# Patient Record
Sex: MALE | Race: WHITE | Employment: FULL TIME | URBAN - METROPOLITAN AREA
[De-identification: names, ages, dates, MRNs, and addresses within clinical notes are randomized per-mention and may not be internally consistent; named-entity substitution may affect disease eponyms.]

---

## 2018-10-16 ENCOUNTER — HOSPITAL ENCOUNTER (OUTPATIENT)
Facility: CLINIC | Age: 28
Discharge: HOME | End: 2018-10-17
Attending: EMERGENCY MEDICINE | Admitting: INTERNAL MEDICINE
Payer: COMMERCIAL

## 2018-10-16 DIAGNOSIS — T78.40XA ALLERGIC REACTION, INITIAL ENCOUNTER: ICD-10-CM

## 2018-10-16 DIAGNOSIS — T78.05XA: ICD-10-CM

## 2018-10-16 PROBLEM — T78.2XXA ANAPHYLACTIC REACTION: Status: ACTIVE | Noted: 2018-10-16

## 2018-10-16 LAB
ANION GAP SERPL CALCULATED.3IONS-SCNC: 7 MMOL/L (ref 3–14)
BASOPHILS # BLD AUTO: 0 10E9/L (ref 0–0.2)
BASOPHILS NFR BLD AUTO: 0.4 %
BUN SERPL-MCNC: 16 MG/DL (ref 7–30)
CALCIUM SERPL-MCNC: 8.3 MG/DL (ref 8.5–10.1)
CHLORIDE SERPL-SCNC: 106 MMOL/L (ref 94–109)
CO2 SERPL-SCNC: 28 MMOL/L (ref 20–32)
CREAT SERPL-MCNC: 0.9 MG/DL (ref 0.66–1.25)
DIFFERENTIAL METHOD BLD: NORMAL
EOSINOPHIL # BLD AUTO: 0.2 10E9/L (ref 0–0.7)
EOSINOPHIL NFR BLD AUTO: 2.6 %
ERYTHROCYTE [DISTWIDTH] IN BLOOD BY AUTOMATED COUNT: 12.3 % (ref 10–15)
GFR SERPL CREATININE-BSD FRML MDRD: >90 ML/MIN/1.7M2
GLUCOSE SERPL-MCNC: 230 MG/DL (ref 70–99)
HCT VFR BLD AUTO: 44.6 % (ref 40–53)
HGB BLD-MCNC: 15.4 G/DL (ref 13.3–17.7)
IMM GRANULOCYTES # BLD: 0 10E9/L (ref 0–0.4)
IMM GRANULOCYTES NFR BLD: 0.1 %
LYMPHOCYTES # BLD AUTO: 2.1 10E9/L (ref 0.8–5.3)
LYMPHOCYTES NFR BLD AUTO: 27.5 %
MCH RBC QN AUTO: 30.7 PG (ref 26.5–33)
MCHC RBC AUTO-ENTMCNC: 34.5 G/DL (ref 31.5–36.5)
MCV RBC AUTO: 89 FL (ref 78–100)
MONOCYTES # BLD AUTO: 0.4 10E9/L (ref 0–1.3)
MONOCYTES NFR BLD AUTO: 5.7 %
NEUTROPHILS # BLD AUTO: 5 10E9/L (ref 1.6–8.3)
NEUTROPHILS NFR BLD AUTO: 63.7 %
NRBC # BLD AUTO: 0 10*3/UL
NRBC BLD AUTO-RTO: 0 /100
PLATELET # BLD AUTO: 182 10E9/L (ref 150–450)
POTASSIUM SERPL-SCNC: 3.6 MMOL/L (ref 3.4–5.3)
RBC # BLD AUTO: 5.01 10E12/L (ref 4.4–5.9)
SODIUM SERPL-SCNC: 141 MMOL/L (ref 133–144)
WBC # BLD AUTO: 7.8 10E9/L (ref 4–11)

## 2018-10-16 PROCEDURE — 85025 COMPLETE CBC W/AUTO DIFF WBC: CPT | Performed by: EMERGENCY MEDICINE

## 2018-10-16 PROCEDURE — 99285 EMERGENCY DEPT VISIT HI MDM: CPT | Mod: 25

## 2018-10-16 PROCEDURE — 99222 1ST HOSP IP/OBS MODERATE 55: CPT | Mod: AI | Performed by: INTERNAL MEDICINE

## 2018-10-16 PROCEDURE — 96374 THER/PROPH/DIAG INJ IV PUSH: CPT

## 2018-10-16 PROCEDURE — 96375 TX/PRO/DX INJ NEW DRUG ADDON: CPT

## 2018-10-16 PROCEDURE — 80048 BASIC METABOLIC PNL TOTAL CA: CPT | Performed by: EMERGENCY MEDICINE

## 2018-10-16 PROCEDURE — 25000125 ZZHC RX 250

## 2018-10-16 PROCEDURE — 25000125 ZZHC RX 250: Performed by: EMERGENCY MEDICINE

## 2018-10-16 PROCEDURE — 96361 HYDRATE IV INFUSION ADD-ON: CPT

## 2018-10-16 PROCEDURE — 25000128 H RX IP 250 OP 636

## 2018-10-16 PROCEDURE — 25000128 H RX IP 250 OP 636: Performed by: EMERGENCY MEDICINE

## 2018-10-16 PROCEDURE — 21000000 ZZH R&B IMCU HEART CARE

## 2018-10-16 PROCEDURE — 96372 THER/PROPH/DIAG INJ SC/IM: CPT

## 2018-10-16 RX ORDER — NITROGLYCERIN 0.4 MG/1
0.4 TABLET SUBLINGUAL EVERY 5 MIN PRN
Status: DISCONTINUED | OUTPATIENT
Start: 2018-10-16 | End: 2018-10-17 | Stop reason: HOSPADM

## 2018-10-16 RX ORDER — NALOXONE HYDROCHLORIDE 0.4 MG/ML
.1-.4 INJECTION, SOLUTION INTRAMUSCULAR; INTRAVENOUS; SUBCUTANEOUS
Status: DISCONTINUED | OUTPATIENT
Start: 2018-10-16 | End: 2018-10-17 | Stop reason: HOSPADM

## 2018-10-16 RX ORDER — METHYLPREDNISOLONE SODIUM SUCCINATE 125 MG/2ML
125 INJECTION, POWDER, LYOPHILIZED, FOR SOLUTION INTRAMUSCULAR; INTRAVENOUS
Status: DISCONTINUED | OUTPATIENT
Start: 2018-10-16 | End: 2018-10-17 | Stop reason: HOSPADM

## 2018-10-16 RX ORDER — ONDANSETRON 2 MG/ML
INJECTION INTRAMUSCULAR; INTRAVENOUS
Status: COMPLETED
Start: 2018-10-16 | End: 2018-10-16

## 2018-10-16 RX ORDER — ONDANSETRON 2 MG/ML
4 INJECTION INTRAMUSCULAR; INTRAVENOUS EVERY 6 HOURS PRN
Status: DISCONTINUED | OUTPATIENT
Start: 2018-10-16 | End: 2018-10-17 | Stop reason: HOSPADM

## 2018-10-16 RX ORDER — DIPHENHYDRAMINE HYDROCHLORIDE 50 MG/ML
25 INJECTION INTRAMUSCULAR; INTRAVENOUS ONCE
Status: COMPLETED | OUTPATIENT
Start: 2018-10-16 | End: 2018-10-16

## 2018-10-16 RX ORDER — ONDANSETRON 2 MG/ML
4 INJECTION INTRAMUSCULAR; INTRAVENOUS ONCE
Status: DISCONTINUED | OUTPATIENT
Start: 2018-10-16 | End: 2018-10-16

## 2018-10-16 RX ORDER — DIPHENHYDRAMINE HYDROCHLORIDE 50 MG/ML
50 INJECTION INTRAMUSCULAR; INTRAVENOUS
Status: DISCONTINUED | OUTPATIENT
Start: 2018-10-16 | End: 2018-10-17 | Stop reason: HOSPADM

## 2018-10-16 RX ORDER — ONDANSETRON 4 MG/1
4 TABLET, ORALLY DISINTEGRATING ORAL EVERY 6 HOURS PRN
Status: DISCONTINUED | OUTPATIENT
Start: 2018-10-16 | End: 2018-10-17 | Stop reason: HOSPADM

## 2018-10-16 RX ORDER — METHYLPREDNISOLONE SODIUM SUCCINATE 125 MG/2ML
125 INJECTION, POWDER, LYOPHILIZED, FOR SOLUTION INTRAMUSCULAR; INTRAVENOUS ONCE
Status: COMPLETED | OUTPATIENT
Start: 2018-10-16 | End: 2018-10-16

## 2018-10-16 RX ORDER — ONDANSETRON 2 MG/ML
4 INJECTION INTRAMUSCULAR; INTRAVENOUS ONCE
Status: COMPLETED | OUTPATIENT
Start: 2018-10-16 | End: 2018-10-16

## 2018-10-16 RX ORDER — ACETAMINOPHEN 325 MG/1
650 TABLET ORAL EVERY 4 HOURS PRN
Status: DISCONTINUED | OUTPATIENT
Start: 2018-10-16 | End: 2018-10-17 | Stop reason: HOSPADM

## 2018-10-16 RX ORDER — SODIUM CHLORIDE 9 MG/ML
INJECTION, SOLUTION INTRAVENOUS CONTINUOUS PRN
Status: DISCONTINUED | OUTPATIENT
Start: 2018-10-16 | End: 2018-10-17 | Stop reason: HOSPADM

## 2018-10-16 RX ORDER — AMOXICILLIN 250 MG
1 CAPSULE ORAL 2 TIMES DAILY PRN
Status: DISCONTINUED | OUTPATIENT
Start: 2018-10-16 | End: 2018-10-17 | Stop reason: HOSPADM

## 2018-10-16 RX ORDER — IBUPROFEN 600 MG/1
600 TABLET, FILM COATED ORAL EVERY 6 HOURS PRN
Status: DISCONTINUED | OUTPATIENT
Start: 2018-10-16 | End: 2018-10-17 | Stop reason: HOSPADM

## 2018-10-16 RX ORDER — AMOXICILLIN 250 MG
2 CAPSULE ORAL 2 TIMES DAILY PRN
Status: DISCONTINUED | OUTPATIENT
Start: 2018-10-16 | End: 2018-10-17 | Stop reason: HOSPADM

## 2018-10-16 RX ORDER — ALBUTEROL SULFATE 90 UG/1
2 AEROSOL, METERED RESPIRATORY (INHALATION)
Status: DISCONTINUED | OUTPATIENT
Start: 2018-10-16 | End: 2018-10-17 | Stop reason: HOSPADM

## 2018-10-16 RX ORDER — LIDOCAINE 40 MG/G
CREAM TOPICAL
Status: DISCONTINUED | OUTPATIENT
Start: 2018-10-16 | End: 2018-10-17 | Stop reason: HOSPADM

## 2018-10-16 RX ORDER — ALBUTEROL SULFATE 0.83 MG/ML
2.5 SOLUTION RESPIRATORY (INHALATION)
Status: DISCONTINUED | OUTPATIENT
Start: 2018-10-16 | End: 2018-10-17 | Stop reason: HOSPADM

## 2018-10-16 RX ADMIN — DIPHENHYDRAMINE HYDROCHLORIDE 25 MG: 50 INJECTION, SOLUTION INTRAMUSCULAR; INTRAVENOUS at 19:08

## 2018-10-16 RX ADMIN — ONDANSETRON 4 MG: 2 INJECTION INTRAMUSCULAR; INTRAVENOUS at 19:09

## 2018-10-16 RX ADMIN — EPINEPHRINE 0.5 MG: 1 INJECTION INTRAMUSCULAR; INTRAVENOUS; SUBCUTANEOUS at 19:06

## 2018-10-16 RX ADMIN — SODIUM CHLORIDE 1000 ML: 9 INJECTION, SOLUTION INTRAVENOUS at 18:42

## 2018-10-16 RX ADMIN — FAMOTIDINE 20 MG: 10 INJECTION, SOLUTION INTRAVENOUS at 18:41

## 2018-10-16 RX ADMIN — METHYLPREDNISOLONE SODIUM SUCCINATE 125 MG: 125 INJECTION, POWDER, FOR SOLUTION INTRAMUSCULAR; INTRAVENOUS at 18:40

## 2018-10-16 RX ADMIN — DIPHENHYDRAMINE HYDROCHLORIDE 25 MG: 50 INJECTION, SOLUTION INTRAMUSCULAR; INTRAVENOUS at 18:40

## 2018-10-16 ASSESSMENT — ENCOUNTER SYMPTOMS
NAUSEA: 0
TROUBLE SWALLOWING: 0
CHEST TIGHTNESS: 1
VOMITING: 0

## 2018-10-16 NOTE — ED PROVIDER NOTES
History     Chief Complaint:  Allergic Reaction    HPI   Ludwin Kwon is a 28 year old male who presents with an allergic reaction. The patient has a history of nut allergies and ate a piece of Lindt chocolate that may have had nuts in it about 45 minutes prior to arrival. Following the ingestion, he had an onset of throat tightness, mild chest tightness, and itching to the groin. He has had nausea and vomiting with his allergic reactions in the past, but no nausea or vomiting today. He self-administered his EpiPen about 30 minutes prior to arrival. No previous hospitalizations or intubation for anaphylaxis. No difficulty swallowing or any other symptoms at this time.    Allergies:  No known drug allergies      Medications:    The patient is currently on no regular medications.     Past Medical History:    The patient does not have any past pertinent medical history.     Past Surgical History:    History reviewed. No pertinent surgical history.     Family History:    History reviewed. No pertinent family history.      Social History:  Smoking status: Never smoker  Alcohol use: No     Review of Systems   HENT: Negative for trouble swallowing.         Throat tightness     Respiratory: Positive for chest tightness.    Gastrointestinal: Negative for nausea and vomiting.   Skin: Positive for rash.   All other systems reviewed and are negative.    Physical Exam   Patient Vitals for the past 24 hrs:   BP Temp Temp src Pulse Heart Rate Resp SpO2 Height Weight   10/16/18 1915 123/64 - - - 86 11 100 % - -   10/16/18 1900 120/78 - - - 92 17 98 % - -   10/16/18 1845 134/79 - - - 78 12 100 % - -   10/16/18 1830 136/81 - - - 103 19 98 % - -   10/16/18 1819 132/69 98.5  F (36.9  C) Temporal 98 - 16 99 % 1.829 m (6') 80.7 kg (178 lb)      Physical Exam  General: Alert, appears well-developed and well-nourished. Cooperative.     In mild distress  HEENT:  Head:  Atraumatic  Ears:  External ears are normal  Mouth/Throat:   Oropharynx is without erythema or exudate and mucous membranes are dry. Tonsils and uvula mildly edematous.   Eyes:   Conjunctivae normal and EOM are normal. No scleral icterus.    Pupils are equal, round, and reactive to light.   CV:  Normal rate, regular rhythm, normal heart sounds and radial pulses are 2+ and symmetric.  No murmur  Resp:  Lungs are clear bilaterally. No wheezing.    Non-labored, no retractions or accessory muscle use  GI:  Abdomen is soft, no distension, no tenderness. No rebound or guarding.  No CVA tenderness bilaterally  MS:  Normal range of motion. No edema.    Normal strength in all 4 extremities.     Back atraumatic.    No midline cervical, thoracic, or lumbar tenderness  Skin:  Warm and dry.  Raised erythema to anterior chest and neck as well as groin spares extremities.   Neuro:   Alert. Normal strength.   GCS: 15  Psych: Normal mood and affect.    Emergency Department Course   Laboratory:  CBC: WNL (WBC 7.8, HGB 15.4, )  BMP: Glucose 230 (H), Calcium 8.3 (L) WNL (Creatinine 0.90)     Interventions:  1840: Solu-Medrol 125 mg IV  1840: Benadryl 25 mg IV  1841: Pepcid 20 mg IV  1842: NS 1L IV Bolus   1906: Adrenalin 0.5 mg IM  1908: Benadryl 25 mg IV  1909: Zofran 4 mg IV     Emergency Department Course:  Past medical records, nursing notes, and vitals reviewed.  1825: I performed an exam of the patient and obtained history, as documented above.   Peripheral IV established. Blood drawn for basic laboratory. Results as noted above.     Patient was given the above interventions while here in the emergency department.   1904: I rechecked the patient. Patient began having vomiting and increased hives to his body. Discussed admission with the patient and he is agreeable with the plan.  1922: I discussed the case with Dr. Delacruz, hospitalist service who accepts the patient for further care, monitoring, and treatment.     Impression & Plan    Medical Decision Making:  Patient is a  28-year-old male with a history of tree nut allergies who presents after a suspected ingestion of potential tree nut.  45 minutes arrival patient had a chocolate which may have been exposed to tree nuts and patient approximately 15 minutes following ingestion started to have some throat swelling as well as high production and concern for potential anaphylaxis.  He self administered an EpiPen 30 minutes prior to arrival in the emergency department.  Here patient has hives to the neck and anterior chest as well as his groin.  He received methylprednisolone, Benadryl, Zantac, and IV fluids although had a repeat episode of vomiting and nausea which is consistent with his prior anaphylactic presentations.  Reassuringly he has never needed to be intubated in the past although has required multiple doses of epinephrine for allergic reactions in the past.  Due to the repeat dose of epinephrine which was administered here in the emergency department after his rebound vomiting and nausea I elected to admit the patient for further observation overnight.  Patient case was discussed with Dr. Delacruz of the hospitalist service who agreed to inpatient [facility] admission at this time.    Diagnosis:    ICD-10-CM   1. Allergic reaction, initial encounter T78.40XA   2. Anaphylaxis due to tree nuts or seeds, initial encounter T78.05XA     Disposition:  Admitted to inpatient [facility]    Christine Tijerina  10/16/2018    EMERGENCY DEPARTMENT  Christine CANNON Do, am serving as a scribe at 6:25 PM on 10/16/2018 to document services personally performed by Duncan Kelley MD based on my observations and the provider's statements to me.       Duncan Kelley MD  10/17/18 0249

## 2018-10-16 NOTE — IP AVS SNAPSHOT
[facility]    [address]    [facility] [address]    Phone:  [phone]                                       After Visit Summary   10/16/2018    Ludwin Kwon    MRN: [id number]           After Visit Summary Signature Page     I have received my discharge instructions, and my questions have been answered. I have discussed any challenges I see with this plan with the nurse or doctor.    ..........................................................................................................................................  Patient/Patient Representative Signature      ..........................................................................................................................................  Patient Representative Print Name and Relationship to Patient    ..................................................               ................................................  Date                                   Time    ..........................................................................................................................................  Reviewed by Signature/Title    ...................................................              ..............................................  Date                                               Time          22EPIC Rev 08/18

## 2018-10-16 NOTE — IP AVS SNAPSHOT
MRN:[id number]                      After Visit Summary   10/16/2018    Ludwin Kwon    MRN: [id number]           Thank you!     Thank you for choosing [facility] for your care. Our goal is always to provide you with excellent care. Hearing back from our patients is one way we can continue to improve our services. Please take a few minutes to complete the written survey that you may receive in the mail after you visit with us. Thank you!        Patient Information     Date Of Birth          1990        Designated Caregiver       Most Recent Value    Caregiver    Will someone help with your care after discharge? yes    Name of designated caregiver none    Phone number of caregiver none    Caregiver address same      About your hospital stay     You were admitted on:  October 16, 2018 You last received care in the:  [facility]    You were discharged on:  October 17, 2018        Reason for your hospital stay       Anaphylaxis                  Who to Call     For medical emergencies, please call 911.  For non-urgent questions about your medical care, please call your primary care provider or clinic, None          Attending Provider     Provider Specialty    Duncan Kelley MD Emergency Medicine    Jayme, Ramiro Olivares MD Internal Medicine    Jaret, Kevin Block MD Internal Medicine       Primary Care Provider Fax #    Physician No Ref-Primary [phone]      After Care Instructions     Activity       Your activity upon discharge: activity as tolerated            Diet       Follow this diet upon discharge: Orders Placed This Encounter      Combination Diet Regular Diet Adult                  Follow-up Appointments     Follow-up and recommended labs and tests        Follow up with primary care provider, Physician No Ref-Primary, within 7 days for hospital follow- up.  No follow up labs or test are needed.                  Pending Results     No orders found for last 3  "day(s).            Statement of Approval     Ordered          10/17/18 1322  I have reviewed and agree with all the recommendations and orders detailed in this document.  EFFECTIVE NOW     Approved and electronically signed by:  Kevin Raygoza MD             Admission Information     Date & Time Provider Department Dept. Phone    10/16/2018 Kevin Raygoza MD [facility] [phone]      Your Vitals Were     Blood Pressure Pulse Temperature Respirations Height Weight    97/50 98 98.3  F (36.8  C) (Oral) 16 1.829 m (6') 78.5 kg (173 lb)    Pulse Oximetry BMI (Body Mass Index)                98% 23.46 kg/m2          MyChart Information     [facility] lets you send messages to your doctor, view your test results, renew your prescriptions, schedule appointments and more. To sign up, go to [email or web address] . Click on \"Log in\" on the left side of the screen, which will take you to the Welcome page. Then click on \"Sign up Now\" on the right side of the page.     You will be asked to enter the access code listed below, as well as some personal information. Please follow the directions to create your username and password.     Your access code is: [email or web address]  Expires: 1/15/2019  1:51 PM     Your access code will  in 90 days. If you need help or a new code, please call your [facility] clinic or [phone].        Care EveryWhere ID     This is your Care EveryWhere ID. This could be used by other organizations to access your [facility] medical records  [id number]        Equal Access to Services     [facility]: Hadii alejandra gonzales hadkatelyno Sonaimaali, waaxda luqadaha, qaybta kaalmada homer santiago . So [facility] [phone].    ATENCIÓN: Si habla español, tiene a fink disposición servicios gratuitos de asistencia lingüística. Llame al [phone].    We comply with applicable federal civil rights laws and [place] laws. We do not discriminate on the " basis of race, color, national origin, age, disability, sex, sexual orientation, or gender identity.               Review of your medicines      START taking        Dose / Directions    EPINEPHrine 0.3 MG/0.3ML injection 2-pack   Commonly known as:  EPIPEN/ADRENACLICK/or ANY BX GENERIC EQUIV   Used for:  Anaphylaxis due to tree nuts or seeds, initial encounter        Dose:  0.3 mg   Inject 0.3 mLs (0.3 mg) into the muscle as needed for anaphylaxis   Quantity:  0.6 mL   Refills:  1            Where to get your medicines      These medications were sent to [facility] [address] [facility], [place] - [address] & [facility]  [address] [facility] [address]     Phone:  [phone]     EPINEPHrine 0.3 MG/0.3ML injection 2-pack                Protect others around you: Learn how to safely use, store and throw away your medicines at www.disposemymeds.org.             Medication List: This is a list of all your medications and when to take them. Check marks below indicate your daily home schedule. Keep this list as a reference.      Medications           Morning Afternoon Evening Bedtime As Needed    EPINEPHrine 0.3 MG/0.3ML injection 2-pack   Commonly known as:  EPIPEN/ADRENACLICK/or ANY BX GENERIC EQUIV   Inject 0.3 mLs (0.3 mg) into the muscle as needed for anaphylaxis                                             More Information        Epinephrine injection (Auto-injector)  Brand Names: Adrenaclick, Auvi-Q, epinephrinesnap, epinephrinesnap-v, EpiPen, Twinject  What is this medicine?  EPINEPHRINE (ep i RENÉ rin) is used for the emergency treatment of severe allergic reactions. You should keep this medicine with you at all times.  How should I use this medicine?  This medicine is for injection into the outer thigh. Your doctor or health care professional will instruct you on the proper use of the device during an emergency. Read all directions carefully and make sure  you understand them. Do not use more often than directed.  Talk to your pediatrician regarding the use of this medicine in children. Special care may be needed. This drug is commonly used in children. A special device is available for use in children. If you are giving this medicine to a young child, hold their leg firmly in place before and during the injection to prevent injury.  What side effects may I notice from receiving this medicine?  Side effects that you should report to your doctor or health care professional as soon as possible:    allergic reactions like skin rash, itching or hives, swelling of the face, lips, or tongue    breathing problems    chest pain    fast, irregular heartbeat    pain, tingling, numbness in the hands or feet    pain, redness, or irritation at site where injected    vomiting  Side effects that usually do not require medical attention (report to your doctor or health care professional if they continue or are bothersome):    anxious    dizziness    dry mouth    headache    increased sweating    nausea    unusually weak or tired  What may interact with this medicine?  This medicine is only used during an emergency. Significant drug interactions are not likely during emergency use.  What if I miss a dose?  This does not apply. You should only use this medicine for an allergic reaction.  Where should I keep my medicine?  Keep out of the reach of children.  Store at room temperature between 15 and 30 degrees C (59 and 86 degrees F). Protect from light and heat. The solution should be clear in color. If the solution is discolored or contains particles it must be replaced. Throw away any unused medicine after the expiration date. Ask your doctor or pharmacist about proper disposal of the injector if it is  or has been used. Always replace your auto-injector before it expires.  What should I tell my health care provider before I take this medicine?  They need to know if you have any  of the following conditions:    diabetes    heart disease    high blood pressure    lung or breathing disease, like asthma    Parkinson's disease    thyroid disease    an unusual or allergic reaction to epinephrine, sulfites, other medicines, foods, dyes, or preservatives    pregnant or trying to get pregnant    breast-feeding  What should I watch for while using this medicine?  Keep this medicine ready for use in the case of a severe allergic reaction. Make sure that you have the phone number of your doctor or health care professional and local hospital ready. Remember to check the expiration date of your medicine regularly. You may need to have additional units of this medicine with you at work, school, or other places. Talk to your doctor or health care professional about your need for extra units. Some emergencies may require an additional dose. Check with your doctor or a health care professional before using an extra dose.  After use, go to the nearest hospital or call 911. Avoid physical activity. Make sure the treating health care professional knows you have received an injection of this medicine. You will receive additional instructions on what to do during and after use of this medicine before a medical emergency occurs.  NOTE:This sheet is a summary. It may not cover all possible information. If you have questions about this medicine, talk to your doctor, pharmacist, or health care provider. Copyright  2018 [employer]vier

## 2018-10-17 VITALS
DIASTOLIC BLOOD PRESSURE: 50 MMHG | RESPIRATION RATE: 16 BRPM | HEART RATE: 98 BPM | BODY MASS INDEX: 23.43 KG/M2 | SYSTOLIC BLOOD PRESSURE: 97 MMHG | OXYGEN SATURATION: 98 % | HEIGHT: 72 IN | TEMPERATURE: 98.3 F | WEIGHT: 173 LBS

## 2018-10-17 PROBLEM — T78.2XXA ANAPHYLAXIS: Status: ACTIVE | Noted: 2018-10-17

## 2018-10-17 PROCEDURE — 40000884 ZZH STATISTIC STEP DOWN HRS NIGHT

## 2018-10-17 PROCEDURE — G0378 HOSPITAL OBSERVATION PER HR: HCPCS

## 2018-10-17 PROCEDURE — 99217 ZZC OBSERVATION CARE DISCHARGE: CPT | Performed by: INTERNAL MEDICINE

## 2018-10-17 RX ORDER — EPINEPHRINE 0.3 MG/.3ML
0.3 INJECTION SUBCUTANEOUS PRN
Qty: 0.6 ML | Refills: 1 | Status: SHIPPED | OUTPATIENT
Start: 2018-10-17

## 2018-10-17 NOTE — H&P
[facility]    History and Physical  Hospitalist       Date of Admission:  10/16/2018  Date of Service (when I saw the patient): 10/16/18    Assessment & Plan   Ludwin Kwon is a 28 year old male who presents with an anaphylactic reaction    Anaphylactic reaction  With known h/o anaphylaxis in the past with peanuts. This evening ate chocolate dessert and after eating started to feel throat tightness, mild chest tightness and groin itching. En route to ED gave himself dose of epi (pen, 0.3 mg). No hospitalizations or intubations 2/2 anaphylaxis in the past. Historically has n/v with anaphylaxis (developed in the ED)  - given second dose of 0.5 mg epi in ED  - given diphenhydramine 25 mg x 2, famotidine 20 mg, methylprednisone 125 mg  - ondansetron for nausea/ vomiting  - epi kit at bedside  - IMC monitoring with continuous O2 saturation monitoring, telemetry  - prn diphenhydramine  - if any respiratory compromise, will need intubation  - prn nebs  - potential discharge in the am if remains stable.     Pain plan: # Pain Assessment:  Current Pain Score 10/16/2018   Pain score (0-10) 3   Ludwin pearson pain level was assessed and he currently denies pain.      DVT Prophylaxis: Low Risk/Ambulatory with no VTE prophylaxis indicated  Code Status: Full Code    Disposition: Expected discharge possible in the am 10/17 if anaphylaxis resolves/ remains stable    Ramiro Delacruz MD  [phone] (P)  Text Page     Primary Care Physician   None    Chief Complaint   Anaphylaxis    History is obtained from the patient and medical records    History of Present Illness   Ludwin Kwon is a 28 year old male who presents with an anaphylactic reaction.  He has a known history of not allergy.  He ingested a chocolate dessert at some point this afternoon.  Shortly after that he started to have tightening in his throat chest tightness and   Itching.  He may recognize that he is likely having an anaphylactic reaction, and he  administered 0.3 mg of epinephrine.  The emergency department he had worsening hives as well.  He was given a dose of methylprednisolone, diphenhydramine x2, and famotidine x1.  At the time my visit he felt as if his reaction was subsiding.  His lip swelling had diminished and he had no shortness of breath.  Denies any chest pain.  Denies any other complaints.    Past Medical History    I have reviewed this patient's medical history and updated it with pertinent information if needed.   History reviewed. No pertinent past medical history.    Past Surgical History   I have reviewed this patient's surgical history and updated it with pertinent information if needed.  History reviewed. No pertinent surgical history.    Prior to Admission Medications   None     Allergies   Allergies   Allergen Reactions     Nuts      Pomegranate [Punica]        Social History   I have reviewed this patient's social history and updated it with pertinent information if needed. Ludwin Kwon  reports that he has never smoked. He has never used smokeless tobacco. He reports that he does not drink alcohol or use illicit drugs.    Family History   I have reviewed this patient's family history and updated it with pertinent information if needed.   Denies family history on questioning    Review of Systems   The 10 point Review of Systems is negative other than noted in the HPI or here.     Physical Exam   Temp: 98.5  F (36.9  C) Temp src: Temporal BP: 123/64 Pulse: 98 Heart Rate: 86 Resp: 11 SpO2: 100 % O2 Device: None (Room air)    Vital Signs with Ranges  173 lbs 11.56 oz    Constitutional: alert, oriented and in no acute distress  Eyes: EOMI, PERRL  HEENT: OP clear. No obvious oropharyngeal edema/ swelling  Respiratory: CTA B without wheezing, good air movement  Cardiovascular: RRR  GI: soft, nontender, nondistended, no HSM  Lymph/Hematologic: no cervical LAD  Genitourinary: deferred  Skin: mild diffuse erythema  Musculoskeletal: no  deformities or arthritis  Neurologic: CN II-XII, WAY, sensation grossly intact  Psychiatric: mood and affect wnl    Data   Data reviewed today:  I personally reviewed no images or EKG's today.    Recent Labs  Lab 10/16/18  1838   WBC 7.8   HGB 15.4   MCV 89         POTASSIUM 3.6   CHLORIDE 106   CO2 28   BUN 16   CR 0.90   ANIONGAP 7   SOPHIA 8.3*   *       No results found for this or any previous visit (from the past 24 hour(s)).

## 2018-10-17 NOTE — ED NOTES
[facility]  ED Nurse Handoff Report    ED Chief complaint: Allergic Reaction (Patient states ate a piece of chocolate and thinks might have nuts in it. Patient self administered his epi pen. Had some throat tightness. States throat feels a little tight and has improved since the epi.)      ED Diagnosis:   Final diagnoses:   Allergic reaction, initial encounter   Anaphylaxis due to tree nuts or seeds, initial encounter       Code Status: Full Code    Allergies:   Allergies   Allergen Reactions     Nuts      Pomegranate [Punica]        Activity level - Baseline/Home:  Independent    Activity Level - Current:   Independent     Needed?: No    Isolation: No  Infection: Not Applicable  Bariatric?: No    Vital Signs:   Vitals:    10/16/18 1830 10/16/18 1845 10/16/18 1900 10/16/18 1915   BP: 136/81 134/79 120/78 123/64   Pulse:       Resp: 19 12 17 11   Temp:       TempSrc:       SpO2: 98% 100% 98% 100%   Weight:       Height:           Cardiac Rhythm: NSR to Sinus Tachycardia    Pain level: 0-10 Pain Scale: 3    Is this patient confused?: No  Snyder - Suicide Severity Rating Scale Completed?  Yes  If yes, what color did the patient score?  White    Patient Report: Initial Complaint: Pt is allergic to nuts and pomegranate. At about 1830 the pt ate chocolate which unknowingly had nuts in nut or was produced in a factory with nuts. Gave himself 1 IM epi PTA. While in ED he developed worsening of hives, nausea, & chest tightness requiring a second dose of IM epi (in addition to other medications - per MAR). Never had stridor or wheezes. Had immediate improvement of symptoms with second dose of epi, but needs to be monitored for return of symptoms. Labs unremarkable.    Family Comments: Wife and kids at bedside.    OBS brochure/video discussed/provided to patient/family: Yes    ED Medications:   Medications   0.9% sodium chloride BOLUS (1,000 mLs Intravenous New Bag 10/16/18 1842)    methylPREDNISolone sodium succinate (solu-MEDROL) injection 125 mg (125 mg Intravenous Given 10/16/18 1840)   diphenhydrAMINE (BENADRYL) injection 25 mg (25 mg Intravenous Given 10/16/18 1840)   famotidine (PEPCID) injection 20 mg (20 mg Intravenous Given 10/16/18 1841)   EPINEPHrine (ADRENALIN) kit 0.5 mg (0.5 mg Intramuscular Given 10/16/18 1906)   diphenhydrAMINE (BENADRYL) injection 25 mg (25 mg Intravenous Given 10/16/18 1908)   ondansetron (ZOFRAN) injection 4 mg (4 mg Intravenous Given 10/16/18 1909)       Drips infusing?:  Yes - NS bolus    For the majority of the shift this patient was Green.   Interventions performed were N/A.    Severe Sepsis OR Septic Shock Diagnosis Present: No    To be done/followed up on inpatient unit:  Continued care.    ED NURSE PHONE NUMBER: [phone]

## 2018-10-17 NOTE — ED NOTES
IV inserted. Labs obtained. Specimens sent to lab. Pt medicated per MAR. Assessment per flowsheet.

## 2018-10-17 NOTE — ED NOTES
Pt's hives worsened and he had 1 episode of emesis. Pt also reported that he felt his chest tightness was returning/worsening. No wheezes. No stridor. Dr. Kelley made aware.

## 2018-10-17 NOTE — DISCHARGE SUMMARY
Admit Date:     10/16/2018   Discharge Date:           PRIMARY CARE PHYSICIAN:  None.      CODE STATUS:  Full code.      DATE OF ADMISSION:  10/16/2018.      DATE OF DISCHARGE:  10/17/2018.      REASON FOR ADMISSION:  Anaphylactic reaction.      DISCHARGE DISPOSITION:  Discharge to home.      FOLLOWUP INSTRUCTIONS:  None.      DISCHARGE MEDICATIONS:  EpiPen 0.3 mg/0.3 mL injection, 2-pack with 1 refill.      CONSULTS:  None.      PHYSICAL EXAMINATION ON DAY OF DISCHARGE:   VITAL SIGNS:  Blood pressure 109/67, O2 sats 98% on room air, respiratory rate 16, heart rate 58, temperature is 98.2 degrees Fahrenheit.   GENERAL:  Well-nourished young male in no apparent distress, alert and oriented x 4.   HEENT:  No oropharyngeal erythema or edema.  No cervical lymphadenopathy, no thyromegaly.   RESPIRATORY:  Lungs clear to auscultation bilaterally, normal work of breathing, no wheeze, rales, rhonchi.  No stridor.   CARDIOVASCULAR:  Regular rate and rhythm.  No murmurs, rubs, or gallops.  2+ pulse palpable in all 4 extremities.   ABDOMEN:  Normal bowel sounds.  Abdomen is soft, nontender, nondistended.  No hepatosplenomegaly or mass palpable.   EXTREMITIES:  No clubbing, cyanosis, or edema.   SKIN:  Cool, dry.  No edema.  No cyanosis.      HOSPITAL COURSE:  Mr. Kwon was admitted by my colleague, Dr. Ramiro Delacruz, last night.  The patient had been at dinner and eating a chocolate dessert.  He has a longstanding ALLERGY FOR PEANUTS WHICH CAUSE ANAPHYLAXIS.  He started having very familiar symptoms to him with his throat feeling closed off, difficulty breathing, and some wheezing.  He came into the Emergency Department immediately.  He has never had to be intubated or hospitalized for his anaphylaxis in the past.  He gave himself his own EpiPen en route to the Emergency Room.  Along with some throat tightness, he did have some chest tightness and itching.  In the Emergency Department, he was given 0.5 mg of epinephrine,  diphenhydramine 50 mg, famotidine 20 mg, and dose of methylprednisolone 125 mg.  We admitted him to our [facility] for close monitoring and had epinephrine kit at bedside.  He did great overnight.  He has shown no signs of biphasic anaphylactic reaction.  It is now 15 hours after admission.  I think he is safe for discharge.  I have got a new prescription for his epinephrine pen and a refill.         KIRAN CALIXTO MD             D: 10/17/2018   T: 10/17/2018   MT: NTS      Name:     MARGE ROCHE   MRN:      [id number]        Account:        [id number]   :      1990           Admit Date:     10/16/2018                                  Discharge Date:       Document: [id number]

## 2018-10-17 NOTE — UTILIZATION REVIEW
"Admission Status; Secondary Review Determination     Under the authority of the Utilization Management Committee, the utilization review process indicated a secondary review on the above patient.  The review outcome is based on review of the medical records, discussions with staff, and applying clinical experience noted on the date of the review.       (x) Observation Status Appropriate - This patient does not meet hospital inpatient criteria and is placed in observation status. If this patient's primary payer is Medicare and was admitted as an inpatient, Condition Code 44 should be used and patient status changed to \"observation\".     RATIONALE FOR DETERMINATION: 28-year-old male with history of anaphylaxis presented with anaphylaxis to the emergency room.  The angioedema symptoms were controlled by the time patient was admitted with no signs of respiratory distress or hypotension.  Observation care appropriate to monitor patient's ongoing reactions to ensure no acute anaphylactic deterioration.  If patient does not stabilize within 24 hours and requires ongoing active management, then at that time inpatient services would be appropriate.       The severity of illness, intensity of service provided, expected LOS and risk for adverse outcome make the care appropriate for further observation; however, doesn't meet criteria for hospital inpatient admission. This was discussed with attending physician who concurred with this determination.    The information on this document is developed by the utilization review team in order for the business office to ensure compliance.  This only denotes the appropriateness of proper admission status and does not reflect the quality of care rendered.         The definitions of Inpatient Status and Observation Status used in making the determination above are those provided in the CMS Coverage Manual, Chapter 1 and Chapter 6, section 70.4.      Sincerely,     Abdi Berg MD  "   Physician Advisor  Utilization Review/ Case Management  [facility].

## 2018-10-17 NOTE — PLAN OF CARE
Problem: Patient Care Overview  Goal: Plan of Care/Patient Progress Review  Outcome: Adequate for Discharge Date Met: 10/17/18  Discharge home with friend, no pain, tolerated food, up in the room independent. Patient is feeling good.

## 2018-10-17 NOTE — PHARMACY-ADMISSION MEDICATION HISTORY
Admission medication history interview status for the 10/16/2018  admission is complete. See EPIC admission navigator for prior to admission medications     Medication history source reliability:Good    Actions taken by pharmacist (provider contacted, etc):None     Additional medication history information not noted on PTA med list :None    Medication reconciliation/reorder completed by provider prior to medication history? No    Time spent in this activity: 10 min    Prior to Admission medications    Topical acne medication

## 2018-10-17 NOTE — PLAN OF CARE
Problem: Patient Care Overview  Goal: Plan of Care/Patient Progress Review  Outcome: No Change  Vss.  Denies pain, sob, itching tightness in throat or chest.  Has a few fading hives.  Taking food and fluids po.  Voiding well.  Wife stayed in room overnight.  Ambulated to bathroom independently.  Epi kit at bedside.

## 2020-03-11 ENCOUNTER — HEALTH MAINTENANCE LETTER (OUTPATIENT)
Age: 30
End: 2020-03-11

## 2021-01-03 ENCOUNTER — HEALTH MAINTENANCE LETTER (OUTPATIENT)
Age: 31
End: 2021-01-03

## 2021-04-25 ENCOUNTER — HEALTH MAINTENANCE LETTER (OUTPATIENT)
Age: 31
End: 2021-04-25

## 2021-10-10 ENCOUNTER — HEALTH MAINTENANCE LETTER (OUTPATIENT)
Age: 31
End: 2021-10-10

## 2022-05-21 ENCOUNTER — HEALTH MAINTENANCE LETTER (OUTPATIENT)
Age: 32
End: 2022-05-21

## 2022-06-27 ENCOUNTER — APPOINTMENT (OUTPATIENT)
Dept: URBAN - METROPOLITAN AREA CLINIC 259 | Age: 32
Setting detail: DERMATOLOGY
End: 2022-06-27

## 2022-06-27 DIAGNOSIS — Z41.9 ENCOUNTER FOR PROCEDURE FOR PURPOSES OTHER THAN REMEDYING HEALTH STATE, UNSPECIFIED: ICD-10-CM

## 2022-06-27 PROCEDURE — OTHER SKINPEN: OTHER

## 2022-06-27 ASSESSMENT — LOCATION SIMPLE DESCRIPTION DERM: LOCATION SIMPLE: LEFT FOREHEAD

## 2022-06-27 ASSESSMENT — LOCATION DETAILED DESCRIPTION DERM: LOCATION DETAILED: LEFT INFERIOR MEDIAL FOREHEAD

## 2022-06-27 ASSESSMENT — LOCATION ZONE DERM: LOCATION ZONE: FACE

## 2022-06-27 NOTE — PROCEDURE: SKINPEN
Location #1: Forehead
Depth In Mm: 0.5
Treatment Number (Optional): 1
Depth In Mm: 1.25
Consent: Written consent obtained, risks reviewed including but not limited to pain, scarring, infection and incomplete improvement.  Patient understands the procedure is cosmetic in nature and will require out of pocket payment.
Location #2: Cheeks
Topical Anesthesia?: BLT cream (benzocaine 20%, lidocaine 10%, tetracaine 10%)
Depth In Mm: 2
Location #3: Chin and Lip
Post-Care Instructions: After the procedure, take precautions agains sun exposure. Do not apply sunscreen for 12 hours after the procedure. Do not apply make-up for 12 hours after the procedure. Avoid alcohol based toners for 10-14 days. After 2-3 days patients can return to their regular skin regimen.
Detail Level: Zone
Depth In Mm: 0.25

## 2022-07-28 ENCOUNTER — APPOINTMENT (OUTPATIENT)
Dept: URBAN - METROPOLITAN AREA CLINIC 259 | Age: 32
Setting detail: DERMATOLOGY
End: 2022-07-28

## 2022-07-28 DIAGNOSIS — Z41.9 ENCOUNTER FOR PROCEDURE FOR PURPOSES OTHER THAN REMEDYING HEALTH STATE, UNSPECIFIED: ICD-10-CM

## 2022-07-28 PROCEDURE — OTHER SKINPEN: OTHER

## 2022-07-28 PROCEDURE — OTHER MICRONEEDLING: OTHER

## 2022-07-28 ASSESSMENT — LOCATION SIMPLE DESCRIPTION DERM
LOCATION SIMPLE: RIGHT CHEEK
LOCATION SIMPLE: INFERIOR FOREHEAD
LOCATION SIMPLE: CHIN
LOCATION SIMPLE: LEFT CHEEK

## 2022-07-28 ASSESSMENT — LOCATION DETAILED DESCRIPTION DERM
LOCATION DETAILED: INFERIOR MID FOREHEAD
LOCATION DETAILED: RIGHT CENTRAL MALAR CHEEK
LOCATION DETAILED: LEFT CENTRAL MALAR CHEEK
LOCATION DETAILED: LEFT CHIN

## 2022-07-28 ASSESSMENT — LOCATION ZONE DERM: LOCATION ZONE: FACE

## 2022-07-28 NOTE — PROCEDURE: MICRONEEDLING
Treatment Number (Optional): 2
Topical Anesthesia?: BLT cream (benzocaine 20%, lidocaine 10%, tetracaine 10%)
Post-Care Instructions: After the procedure, take precautions agains sun exposure. Do not apply sunscreen for 12 hours after the procedure. Do not apply make-up for 12 hours after the procedure. Avoid alcohol based toners for 10-14 days. After 2-3 days patients can return to their regular skin regimen.
Depth In Mm: 0.1
Detail Level: Zone
Consent: Written consent obtained, risks reviewed including but not limited to pain, scarring, infection and incomplete improvement.  Patient understands the procedure is cosmetic in nature and will require out of pocket payment.

## 2022-07-28 NOTE — PROCEDURE: SKINPEN
Depth In Mm: 0.25
Detail Level: Zone
Location #3: Nose
Depth In Mm: 0.5
Price (Use Numbers Only, No Special Characters Or $): 233.33
Treatment Number (Optional): 0
Consent: Written consent obtained, risks reviewed including but not limited to pain, scarring, infection and incomplete improvement.  Patient understands the procedure is cosmetic in nature and will require out of pocket payment.
Post-Care Instructions: After the procedure, take precautions agains sun exposure. Do not apply sunscreen for 12 hours after the procedure. Do not apply make-up for 12 hours after the procedure. Avoid alcohol based toners for 10-14 days. After 2-3 days patients can return to their regular skin regimen.
Location #4: Lip
Location #2: Cheeks
Location #5: Lower Eyes
Location #1: Jewels

## 2022-09-18 ENCOUNTER — HEALTH MAINTENANCE LETTER (OUTPATIENT)
Age: 32
End: 2022-09-18

## 2022-10-06 ENCOUNTER — APPOINTMENT (OUTPATIENT)
Dept: URBAN - METROPOLITAN AREA CLINIC 281 | Age: 32
Setting detail: DERMATOLOGY
End: 2022-10-06

## 2022-10-06 DIAGNOSIS — Z41.9 ENCOUNTER FOR PROCEDURE FOR PURPOSES OTHER THAN REMEDYING HEALTH STATE, UNSPECIFIED: ICD-10-CM

## 2022-10-06 PROCEDURE — OTHER SKINPEN: OTHER

## 2022-10-06 ASSESSMENT — LOCATION DETAILED DESCRIPTION DERM: LOCATION DETAILED: RIGHT MEDIAL FOREHEAD

## 2022-10-06 ASSESSMENT — LOCATION SIMPLE DESCRIPTION DERM: LOCATION SIMPLE: RIGHT FOREHEAD

## 2022-10-06 ASSESSMENT — LOCATION ZONE DERM: LOCATION ZONE: FACE

## 2022-10-06 NOTE — PROCEDURE: SKINPEN
Location #2: Cheeks
Depth In Mm: 0.5
Treatment Number (Optional): 3
Topical Anesthesia?: BLT cream (benzocaine 20%, lidocaine 10%, tetracaine 10%)
Depth In Mm: 2
Depth In Mm: 0.25
Location #1: Forehead
Detail Level: Zone
Consent: Written consent obtained, risks reviewed including but not limited to pain, scarring, infection and incomplete improvement.  Patient understands the procedure is cosmetic in nature and will require out of pocket payment.
Location #3: Chin and Lip
Depth In Mm: 1.25
Post-Care Instructions: After the procedure, take precautions agains sun exposure. Do not apply sunscreen for 12 hours after the procedure. Do not apply make-up for 12 hours after the procedure. Avoid alcohol based toners for 10-14 days. After 2-3 days patients can return to their regular skin regimen.

## 2023-06-04 ENCOUNTER — HEALTH MAINTENANCE LETTER (OUTPATIENT)
Age: 33
End: 2023-06-04